# Patient Record
(demographics unavailable — no encounter records)

---

## 2024-10-08 NOTE — HISTORY OF PRESENT ILLNESS
[de-identified] : Cough, phlegm and PND sensation which causes him to be nauseated. He has a runny nose despite azelastine and fluticasone daily. Previously ipratropium wasn't helpful. No obvious allergies GERD for which he uses a wedge pillow at night. He takes omeprazole twice/day but gets reflux all the time anyway. Known large HH which recurred after a fundo 30 yrs ago.  CYNTHIA on CPAP Has hearing aids; no tinnitus. He has an outside audiologist who tests him regularly.

## 2024-10-08 NOTE — ASSESSMENT
[FreeTextEntry1] : We reviewed reflux precautions and the patient was provided with the corresponding educational handout.  We discussed a possible PNN ablation if ipratropium works for him

## 2024-10-08 NOTE — PHYSICAL EXAM
[Hearing Loss Right Only] : diminished [Hearing Loss Left Only] : diminished [FreeTextEntry8] : Obstructing cerumen was removed with a hook & suction [FreeTextEntry9] : obstructing cerumen removed with suction [Laryngoscopy Performed] : laryngoscopy was performed, see procedure section for findings [Normal] : no masses and lesions seen, face is symmetric

## 2024-10-08 NOTE — PROCEDURE
[de-identified] : Indication: requirement for exam not possible via anterior examination; globus, CYNTHIA After verbal consent and the administration of an aerosolized oxymetazoline/lidocaine mix, examination was performed with a flexible endoscope placed through the nose which was attached to a video monitoring system. Findings: Nasopharynx: unremarkable Soft palate, lateral and posterior pharyngeal walls: unremarkable Base of tongue & lingual tonsil: moderate retrodisplacement Vallecula: unremarkable Epiglottis: unremarkable Piriform sinuses and pharyngoesophageal junction: unremarkable Arytenoids and AE folds: mild-mod interarytenoid edema Ventricle and false vocal folds: FVF edema True vocal folds: Smooth free edge w/ TA bowing; surface without ectasias or edema; normal movement bilaterally with good apposition in phonation Visible subglottis: unremarkable Narrow-band imaging: ok Other findings: ELM; supraglottic squeeze w/ phonating

## 2024-10-08 NOTE — CONSULT LETTER
[Dear  ___] : Dear  [unfilled], [Consult Letter:] : I had the pleasure of evaluating your patient, [unfilled]. [Please see my note below.] : Please see my note below. [Consult Closing:] : Thank you very much for allowing me to participate in the care of this patient.  If you have any questions, please do not hesitate to contact me. [Sincerely,] : Sincerely, [FreeTextEntry3] : SUMMER Bahena Jr, MD, FAAOHNS Otolaryngologist [facility]

## 2024-10-21 NOTE — PHYSICAL EXAM
[Ambulatory and capable of all self care but unable to carry out any work activities] : Status 2- Ambulatory and capable of all self care but unable to carry out any work activities. Up and about more than 50% of waking hours [General Appearance - Alert] : alert [General Appearance - In No Acute Distress] : in no acute distress [Neck Appearance] : the appearance of the neck was normal [Jugular Venous Distention Increased] : there was no jugular-venous distention [Exaggerated Use Of Accessory Muscles For Inspiration] : no accessory muscle use [Heart Rate And Rhythm] : heart rate was normal and rhythm regular [Heart Sounds] : normal S1 and S2 [Abnormal Walk] : normal gait [Nail Clubbing] : no clubbing  or cyanosis of the fingernails [Skin Color & Pigmentation] : normal skin color and pigmentation [] : no rash [Sensation] : the sensory exam was normal to light touch and pinprick [Oriented To Time, Place, And Person] : oriented to person, place, and time [Affect] : the affect was normal [Mood] : the mood was normal [FreeTextEntry1] : ambulates w/ cane  [No Focal Deficits] : no focal deficits

## 2024-10-21 NOTE — ASSESSMENT
[FreeTextEntry1] : Patient is a nearly 92 year old male, former smoker with a history of COPD, CKD (Cr 1.78 and eGFR 36), HTN, and CAD who is referred for a symptomatic recurrent hiatal hernia by James Amaro and Nga. He had a hiatal hernia repair 30 years ago through a left thoracotomy. It recurred approximately 20 years ago and he has been living with it since. He reports that he vomits fairly frequently, aspirates and was recently admitted to a hospital in the [facility] with what sounds like a significant aspiration pneumonia requiring intubation and ICU admission; I do not have any records of this stay, however. He was evaluated by a general surgeon previously who offered a laparoscopic robotic assisted repair. He declined intervention at this time. He presents to clinic for treatment recommendations.   His imaging was reviewed. He has approximately two thirds of his stomach in his chest and his proximal esophagus is dilated and fluid filled. He has some interstitial changes in his lungs of aspiration. He reports that he tolerates a diet, has not been losing weight, but does vomit frequently and had a significant aspiration event. He has an indication for repair. Discussed the robotic assisted laparoscopic approach to reduce the hernia, close the hiatus, and fix the anterior gastric wall to the anterior abdominal wall with sutures. Additionally, discussed placement of a gastrostomy tube at the time of surgery for post operative feeding and as a third point of fixation to the anterior abdominal wall. Because of his long standing recurrence and dilation of his esophagus, it likely has abnormal function and even after reduction of the hernia, I would want to reduce the risk of aspiration post operatively by avoiding oral feeds immediately post operatively. Patient understood the operative recommendation.   For this patient, the question is operability and given his age and comorbidities, what is his operative risk. He is a very functional nearly 92 year old, lives with his wife and performs all of his ADLs. His  is firm and he does not appear frail at all. Discussed that this operation would be to improve his quality of life as well as to prevent any further episodes of aspiration, that could be life threatening. He understood this.   We will obtain a medical evaluation to assess his operative risk. We will obtain spirometry. He does not need any further testing specific to the hernia because it would not change the operative plan. Once all evaluations have been performed, he will return to clinic to discuss risk and next steps forward. He understood this plan and agreed. All questions were answered and concerns were addressed.   PLAN 1.  Cardiology evaluation 2.  Medical evaluation 3.  Spirometry 4.  Return to clinic  I, Dr. Olsen, personally performed the evaluation and management (E/M) services for this new patient. That E/M includes conducting the clinically appropriate initial history &/or exam, assessing all conditions, and establishing the plan of care. Today, my RAMBO, [Askem], was here to observe my evaluation and management service for this patient & follow plan of care established by me going forward.

## 2024-10-21 NOTE — HISTORY OF PRESENT ILLNESS
[FreeTextEntry1] : Patient is a 90 y/o male with a large recurrent hiatal hernia with reflux, emesis and suspected aspiration. His pmhx is significant for CKD, HTN, HLD, CAD, COPD, emphysema, hypothyroidism and CYNTHIA (not using his CPAP).  He initially underwent hiatal hernia repair with Dr. Herson Braun in 1995 via L thoracotomy. Reports that he felt well until a few years ago when his symptoms recurred.  Esophagram on 02/07/2024 showed a large hiatal hernia and tortuous and dilated distal esophagus up to 3.9 cm. He is currently taking Famotidine 40mg at bedtime, Pantoprazole 40mg prior to breakfast and dinner, Gaviscon 2-3x per day, and sleeping with a wedge pillow. States that he vomits one or two days a week with wrenching.  Of note, he was hospitalized in early August for six days with pneumonia in the [facility], intubated for three days. During that time, he had a bronchoscopy with removal of particulate matter. He reports vomiting when EMTs took him to the hospital. Pt has post-nasal drip, feels that that contributes to him getting sick and vomiting, scheduled to follow up with Dr. Welch (ENT).   He is referred by DR. JOSE RODAS.  Work up is as follows:  PFT done on 10/8/24 showed FEV1 =  1.97, 74% of predicted value, FVC =3.44 , 94% of predicted value, PEF = 4.15, 53% of predicted value and DLCO =7.6 , 35% of predicted value.  6 min walk test 10/8/24: Patient completed 1500 feet, 457 meters w/ cane. Pre: 138/73, HR 87, 95% RA, dyspnea 0, fatigue 0 Post: 139/72, HR 99, 95% RA, dyspnea 4, fatigue 3  CT chest 9/20/24: Persistent multifocal bilateral infectious/inflammatory pneumonitis, greater on the right than on the left.  Xray esophagram 2/7/24: 1.  Large hiatal hernia. 2.  Tortuous and dilated distal esophagus up to 3.9 cm.  He reports being evaluated by multiple providers at [facility] for his s/s most recently with Dr. Nelson. Notes a chronic cough, reflux not relieved by multiple PPIs, post-nasal drip, and emesis at least once weekly with retching.

## 2024-10-21 NOTE — HISTORY OF PRESENT ILLNESS
[FreeTextEntry1] : Patient is a 90 y/o male with a large recurrent hiatal hernia with reflux, emesis and suspected aspiration. His pmhx is significant for CKD, HTN, HLD, CAD, COPD, emphysema, hypothyroidism and CYNTHIA (not using his CPAP).  He initially underwent hiatal hernia repair with Dr. Herson Braun in 1995 via L thoracotomy. Reports that he felt well until a few years ago when his symptoms recurred.  Esophagram on 02/07/2024 showed a large hiatal hernia and tortuous and dilated distal esophagus up to 3.9 cm. He is currently taking Famotidine 40mg at bedtime, Pantoprazole 40mg prior to breakfast and dinner, Gaviscon 2-3x per day, and sleeping with a wedge pillow. States that he vomits one or two days a week with wrenching.  Of note, he was hospitalized in early August for six days with pneumonia in the [facility], intubated for three days. During that time, he had a bronchoscopy with removal of particulate matter. He reports vomiting when EMTs took him to the hospital. Pt has post-nasal drip, feels that that contributes to him getting sick and vomiting, scheduled to follow up with Dr. Welch (ENT).   He is referred by DR. JOSE RODAS.  Work up is as follows:  PFT done on 10/8/24 showed FEV1 =  1.97, 74% of predicted value, FVC =3.44 , 94% of predicted value, PEF = 4.15, 53% of predicted value and DLCO =7.6 , 35% of predicted value.  6 min walk test 10/8/24: Patient completed 1500 feet, 457 meters w/ cane. Pre: 138/73, HR 87, 95% RA, dyspnea 0, fatigue 0 Post: 139/72, HR 99, 95% RA, dyspnea 4, fatigue 3  CT chest 9/20/24: Persistent multifocal bilateral infectious/inflammatory pneumonitis, greater on the right than on the left.  Xray esophagram 2/7/24: 1.  Large hiatal hernia. 2.  Tortuous and dilated distal esophagus up to 3.9 cm.  He reports being evaluated by multiple providers at [facility] for his s/s most recently with Dr. Nelson. Notes a chronic cough, reflux not relieved by multiple PPIs, post-nasal drip, and emesis at least once weekly with retching.

## 2024-10-21 NOTE — CONSULT LETTER
[FreeTextEntry2] : DR. JOSE RODAS [FreeTextEntry3] : Ferdinand Olsen MD   Attending Thoracic Surgeon  Department of Cardiovascular and Thoracic Surgery   of Cardiothoracic Surgery  [employer] at [facility], Division of Thoracic Surgery  [address] [id number]  Tel: [phone]  Fax: [phone]

## 2024-10-21 NOTE — ASSESSMENT
[FreeTextEntry1] : Patient is a nearly 92 year old male, former smoker with a history of COPD, CKD (Cr 1.78 and eGFR 36), HTN, and CAD who is referred for a symptomatic recurrent hiatal hernia by James Amaro and Nga. He had a hiatal hernia repair 30 years ago through a left thoracotomy. It recurred approximately 20 years ago and he has been living with it since. He reports that he vomits fairly frequently, aspirates and was recently admitted to a hospital in the [facility] with what sounds like a significant aspiration pneumonia requiring intubation and ICU admission; I do not have any records of this stay, however. He was evaluated by a general surgeon previously who offered a laparoscopic robotic assisted repair. He declined intervention at this time. He presents to clinic for treatment recommendations.   His imaging was reviewed. He has approximately two thirds of his stomach in his chest and his proximal esophagus is dilated and fluid filled. He has some interstitial changes in his lungs of aspiration. He reports that he tolerates a diet, has not been losing weight, but does vomit frequently and had a significant aspiration event. He has an indication for repair. Discussed the robotic assisted laparoscopic approach to reduce the hernia, close the hiatus, and fix the anterior gastric wall to the anterior abdominal wall with sutures. Additionally, discussed placement of a gastrostomy tube at the time of surgery for post operative feeding and as a third point of fixation to the anterior abdominal wall. Because of his long standing recurrence and dilation of his esophagus, it likely has abnormal function and even after reduction of the hernia, I would want to reduce the risk of aspiration post operatively by avoiding oral feeds immediately post operatively. Patient understood the operative recommendation.   For this patient, the question is operability and given his age and comorbidities, what is his operative risk. He is a very functional nearly 92 year old, lives with his wife and performs all of his ADLs. His  is firm and he does not appear frail at all. Discussed that this operation would be to improve his quality of life as well as to prevent any further episodes of aspiration, that could be life threatening. He understood this.   We will obtain a medical evaluation to assess his operative risk. We will obtain spirometry. He does not need any further testing specific to the hernia because it would not change the operative plan. Once all evaluations have been performed, he will return to clinic to discuss risk and next steps forward. He understood this plan and agreed. All questions were answered and concerns were addressed.   PLAN 1.  Cardiology evaluation 2.  Medical evaluation 3.  Spirometry 4.  Return to clinic  I, Dr. Olsen, personally performed the evaluation and management (E/M) services for this new patient. That E/M includes conducting the clinically appropriate initial history &/or exam, assessing all conditions, and establishing the plan of care. Today, my RAMBO, [[email or web address]], was here to observe my evaluation and management service for this patient & follow plan of care established by me going forward.

## 2024-10-21 NOTE — ASSESSMENT
[FreeTextEntry1] : Patient is a nearly 92 year old male, former smoker with a history of COPD, CKD (Cr 1.78 and eGFR 36), HTN, and CAD who is referred for a symptomatic recurrent hiatal hernia by James Amaro and Nga. He had a hiatal hernia repair 30 years ago through a left thoracotomy. It recurred approximately 20 years ago and he has been living with it since. He reports that he vomits fairly frequently, aspirates and was recently admitted to a hospital in the [facility] with what sounds like a significant aspiration pneumonia requiring intubation and ICU admission; I do not have any records of this stay, however. He was evaluated by a general surgeon previously who offered a laparoscopic robotic assisted repair. He declined intervention at this time. He presents to clinic for treatment recommendations.   His imaging was reviewed. He has approximately two thirds of his stomach in his chest and his proximal esophagus is dilated and fluid filled. He has some interstitial changes in his lungs of aspiration. He reports that he tolerates a diet, has not been losing weight, but does vomit frequently and had a significant aspiration event. He has an indication for repair. Discussed the robotic assisted laparoscopic approach to reduce the hernia, close the hiatus, and fix the anterior gastric wall to the anterior abdominal wall with sutures. Additionally, discussed placement of a gastrostomy tube at the time of surgery for post operative feeding and as a third point of fixation to the anterior abdominal wall. Because of his long standing recurrence and dilation of his esophagus, it likely has abnormal function and even after reduction of the hernia, I would want to reduce the risk of aspiration post operatively by avoiding oral feeds immediately post operatively. Patient understood the operative recommendation.   For this patient, the question is operability and given his age and comorbidities, what is his operative risk. He is a very functional nearly 92 year old, lives with his wife and performs all of his ADLs. His  is firm and he does not appear frail at all. Discussed that this operation would be to improve his quality of life as well as to prevent any further episodes of aspiration, that could be life threatening. He understood this.   We will obtain a medical evaluation to assess his operative risk. We will obtain spirometry. He does not need any further testing specific to the hernia because it would not change the operative plan. Once all evaluations have been performed, he will return to clinic to discuss risk and next steps forward. He understood this plan and agreed. All questions were answered and concerns were addressed.   PLAN 1.  Cardiology evaluation 2.  Medical evaluation 3.  Spirometry 4.  Return to clinic  I, Dr. Olsen, personally performed the evaluation and management (E/M) services for this new patient. That E/M includes conducting the clinically appropriate initial history &/or exam, assessing all conditions, and establishing the plan of care. Today, my RAMBO, [[employer]], was here to observe my evaluation and management service for this patient & follow plan of care established by me going forward.

## 2024-10-21 NOTE — ASSESSMENT
[FreeTextEntry1] : Patient is a nearly 92 year old male, former smoker with a history of COPD, CKD (Cr 1.78 and eGFR 36), HTN, and CAD who is referred for a symptomatic recurrent hiatal hernia by James Amaro and Nga. He had a hiatal hernia repair 30 years ago through a left thoracotomy. It recurred approximately 20 years ago and he has been living with it since. He reports that he vomits fairly frequently, aspirates and was recently admitted to a hospital in the [facility] with what sounds like a significant aspiration pneumonia requiring intubation and ICU admission; I do not have any records of this stay, however. He was evaluated by a general surgeon previously who offered a laparoscopic robotic assisted repair. He declined intervention at this time. He presents to clinic for treatment recommendations.   His imaging was reviewed. He has approximately two thirds of his stomach in his chest and his proximal esophagus is dilated and fluid filled. He has some interstitial changes in his lungs of aspiration. He reports that he tolerates a diet, has not been losing weight, but does vomit frequently and had a significant aspiration event. He has an indication for repair. Discussed the robotic assisted laparoscopic approach to reduce the hernia, close the hiatus, and fix the anterior gastric wall to the anterior abdominal wall with sutures. Additionally, discussed placement of a gastrostomy tube at the time of surgery for post operative feeding and as a third point of fixation to the anterior abdominal wall. Because of his long standing recurrence and dilation of his esophagus, it likely has abnormal function and even after reduction of the hernia, I would want to reduce the risk of aspiration post operatively by avoiding oral feeds immediately post operatively. Patient understood the operative recommendation.   For this patient, the question is operability and given his age and comorbidities, what is his operative risk. He is a very functional nearly 92 year old, lives with his wife and performs all of his ADLs. His  is firm and he does not appear frail at all. Discussed that this operation would be to improve his quality of life as well as to prevent any further episodes of aspiration, that could be life threatening. He understood this.   We will obtain a medical evaluation to assess his operative risk. We will obtain spirometry. He does not need any further testing specific to the hernia because it would not change the operative plan. Once all evaluations have been performed, he will return to clinic to discuss risk and next steps forward. He understood this plan and agreed. All questions were answered and concerns were addressed.   PLAN 1.  Cardiology evaluation 2.  Medical evaluation 3.  Spirometry 4.  Return to clinic  I, Dr. Olsen, personally performed the evaluation and management (E/M) services for this new patient. That E/M includes conducting the clinically appropriate initial history &/or exam, assessing all conditions, and establishing the plan of care. Today, my RAMBO, [[email or web address]], was here to observe my evaluation and management service for this patient & follow plan of care established by me going forward.

## 2024-10-21 NOTE — CONSULT LETTER
[FreeTextEntry2] : DR. JOSE RODAS [FreeTextEntry3] : Ferdinand Olsen MD   Attending Thoracic Surgeon  Department of Cardiovascular and Thoracic Surgery   of Cardiothoracic Surgery  [employer] at [facility], Division of Thoracic Surgery  [address] [id number]  Tel: [phone]  Fax: [phone]

## 2024-10-21 NOTE — HISTORY OF PRESENT ILLNESS
[FreeTextEntry1] : Patient is a 92 y/o male with a large recurrent hiatal hernia with reflux, emesis and suspected aspiration. His pmhx is significant for CKD, HTN, HLD, CAD, COPD, emphysema, hypothyroidism and CYNTHIA (not using his CPAP).  He initially underwent hiatal hernia repair with Dr. Herson Braun in 1995 via L thoracotomy. Reports that he felt well until a few years ago when his symptoms recurred.  Esophagram on 02/07/2024 showed a large hiatal hernia and tortuous and dilated distal esophagus up to 3.9 cm. He is currently taking Famotidine 40mg at bedtime, Pantoprazole 40mg prior to breakfast and dinner, Gaviscon 2-3x per day, and sleeping with a wedge pillow. States that he vomits one or two days a week with wrenching.  Of note, he was hospitalized in early August for six days with pneumonia in the [facility], intubated for three days. During that time, he had a bronchoscopy with removal of particulate matter. He reports vomiting when EMTs took him to the hospital. Pt has post-nasal drip, feels that that contributes to him getting sick and vomiting, scheduled to follow up with Dr. Welch (ENT).   He is referred by DR. JOSE RODAS.  Work up is as follows:  PFT done on 10/8/24 showed FEV1 =  1.97, 74% of predicted value, FVC =3.44 , 94% of predicted value, PEF = 4.15, 53% of predicted value and DLCO =7.6 , 35% of predicted value.  6 min walk test 10/8/24: Patient completed 1500 feet, 457 meters w/ cane. Pre: 138/73, HR 87, 95% RA, dyspnea 0, fatigue 0 Post: 139/72, HR 99, 95% RA, dyspnea 4, fatigue 3  CT chest 9/20/24: Persistent multifocal bilateral infectious/inflammatory pneumonitis, greater on the right than on the left.  Xray esophagram 2/7/24: 1.  Large hiatal hernia. 2.  Tortuous and dilated distal esophagus up to 3.9 cm.  He reports being evaluated by multiple providers at [facility] for his s/s most recently with Dr. Nelson. Notes a chronic cough, reflux not relieved by multiple PPIs, post-nasal drip, and emesis at least once weekly with retching.

## 2024-10-21 NOTE — HISTORY OF PRESENT ILLNESS
[FreeTextEntry1] : Patient is a 92 y/o male with a large recurrent hiatal hernia with reflux, emesis and suspected aspiration. His pmhx is significant for CKD, HTN, HLD, CAD, COPD, emphysema, hypothyroidism and CYNTHIA (not using his CPAP).  He initially underwent hiatal hernia repair with Dr. Herson Braun in 1995 via L thoracotomy. Reports that he felt well until a few years ago when his symptoms recurred.  Esophagram on 02/07/2024 showed a large hiatal hernia and tortuous and dilated distal esophagus up to 3.9 cm. He is currently taking Famotidine 40mg at bedtime, Pantoprazole 40mg prior to breakfast and dinner, Gaviscon 2-3x per day, and sleeping with a wedge pillow. States that he vomits one or two days a week with wrenching.  Of note, he was hospitalized in early August for six days with pneumonia in the [facility], intubated for three days. During that time, he had a bronchoscopy with removal of particulate matter. He reports vomiting when EMTs took him to the hospital. Pt has post-nasal drip, feels that that contributes to him getting sick and vomiting, scheduled to follow up with Dr. Welch (ENT).   He is referred by DR. JOSE RODAS.  Work up is as follows:  PFT done on 10/8/24 showed FEV1 =  1.97, 74% of predicted value, FVC =3.44 , 94% of predicted value, PEF = 4.15, 53% of predicted value and DLCO =7.6 , 35% of predicted value.  6 min walk test 10/8/24: Patient completed 1500 feet, 457 meters w/ cane. Pre: 138/73, HR 87, 95% RA, dyspnea 0, fatigue 0 Post: 139/72, HR 99, 95% RA, dyspnea 4, fatigue 3  CT chest 9/20/24: Persistent multifocal bilateral infectious/inflammatory pneumonitis, greater on the right than on the left.  Xray esophagram 2/7/24: 1.  Large hiatal hernia. 2.  Tortuous and dilated distal esophagus up to 3.9 cm.  He reports being evaluated by multiple providers at [facility] for his s/s most recently with Dr. Nelson. Notes a chronic cough, reflux not relieved by multiple PPIs, post-nasal drip, and emesis at least once weekly with retching.

## 2024-10-21 NOTE — CONSULT LETTER
[FreeTextEntry2] : DR. JOSE RODAS [FreeTextEntry3] : Ferdinand Olsen MD   Attending Thoracic Surgeon  Department of Cardiovascular and Thoracic Surgery   of Cardiothoracic Surgery  [employer] at [facility], Division of Thoracic Surgery  [address] [address]  Tel: [phone]  Fax: [phone]

## 2024-10-21 NOTE — CONSULT LETTER
[FreeTextEntry2] : DR. JOSE RODAS [FreeTextEntry3] : Ferdinand Olsen MD   Attending Thoracic Surgeon  Department of Cardiovascular and Thoracic Surgery   of Cardiothoracic Surgery  [employer] at [facility], Division of Thoracic Surgery  [address] [address]  Tel: [phone]  Fax: [phone]

## 2024-10-28 NOTE — ASSESSMENT
[FreeTextEntry1] : 90 year old  man with BPH s/p PAE x 2 and HoLEP 6/29/23. He is off all BPH medications. Elevated PSA 7.8 ng/ml (higher pre PAE), negative MRI 8/2021, normal PSAD 0.04 ng/ml/cc, one prior negative biopsy 2001, denies FH, neg REILLY.  BPH s/p PAE 5/23/23  x and HoLEP 6/29/23  - s/p repeat PAE with NBCA 5/23/23 and HoLEP 6/29/23 (pathology BPH) - hx of severe hydroureternephrosis, was on terazosin and finasteride in past, no meds now - US on 11/2/23 showed Unchanged mild fullness of the collecting system of the left inferior moiety. No obstructive hydronephrosis - 9/23/24 creatinine 1.84 with Dr. Beasley, also following with Dr. Mascorro - PVR (10/28/24) 347cc  (confirmed x 2) - recommend cystoscopy to rule out stricture and UDS, both can be done by Dr. Gallagher, then RTC after to discuss findings with myself or Dr Crain   Elevated PSA - Prostate MRI from 2/22/23 was negative for lesions - Given age of 90, negative MRI, previous neg biopsy, PSAD 0.04 ng/ml/cc and all other clinical factors he is in agreement to stop screening for prostate cancer.  Microscopic hematuria - MR Urogram reviewed with Dr Nathaniel Null 3/20/23- duplicated system LEFT. mod-severe left hydronephrosis and hydroureter. - Cystoscopy 4/24/23 - negative for stricture but debris within the bladder - denies gross hematuria, ua/ culture sent today 1(0/28/24)  RTC in ~1 month after UDS and cystoscopy with Dr. Gallagher   Thank you very much for allowing me to assist in the care of this patient. Please do not hesitate to contact me with any additional questions or concerns.     Sincerely,     Tera Abdullahi D.O. Professor of Urology and Radiology  of Urology at [facility] Director for Prostate Cancer [address] [place] [address], 5th Floor [facility], [address] Phone: [phone]   I was present with the Resident (Boaz Poole MD) during the hernandez portions of the history and exam. I discussed the case with the Resident and agree with the findings and plan as documented in the Resident/Fellow 's note, unless noted below.

## 2024-10-28 NOTE — ASSESSMENT
[FreeTextEntry1] : 90 year old  man with BPH s/p PAE x 2 and HoLEP 6/29/23. He is off all BPH medications. Elevated PSA 7.8 ng/ml (higher pre PAE), negative MRI 8/2021, normal PSAD 0.04 ng/ml/cc, one prior negative biopsy 2001, denies FH, neg REILLY.  BPH s/p PAE 5/23/23  x and HoLEP 6/29/23  - s/p repeat PAE with NBCA 5/23/23 and HoLEP 6/29/23 (pathology BPH) - hx of severe hydroureternephrosis, was on terazosin and finasteride in past, no meds now - US on 11/2/23 showed Unchanged mild fullness of the collecting system of the left inferior moiety. No obstructive hydronephrosis - 9/23/24 creatinine 1.84 with Dr. Beasley, also following with Dr. Mascorro - PVR (10/28/24) 347cc  (confirmed x 2) - recommend cystoscopy to rule out stricture and UDS, both can be done by Dr. Gallagher, then RTC after to discuss findings with myself or Dr Crain   Elevated PSA - Prostate MRI from 2/22/23 was negative for lesions - Given age of 90, negative MRI, previous neg biopsy, PSAD 0.04 ng/ml/cc and all other clinical factors he is in agreement to stop screening for prostate cancer.  Microscopic hematuria - MR Urogram reviewed with Dr Nathaniel Null 3/20/23- duplicated system LEFT. mod-severe left hydronephrosis and hydroureter. - Cystoscopy 4/24/23 - negative for stricture but debris within the bladder - denies gross hematuria, ua/ culture sent today 1(0/28/24)  RTC in ~1 month after UDS and cystoscopy with Dr. Gallagher   Thank you very much for allowing me to assist in the care of this patient. Please do not hesitate to contact me with any additional questions or concerns.     Sincerely,     Tera Abdullahi D.O. Professor of Urology and Radiology  of Urology at [facility] Director for Prostate Cancer [address] [place] [address], 5th Floor [facility], [address] Phone: [phone]   I was present with the Resident (Boaz Poole MD) during the hernandez portions of the history and exam. I discussed the case with the Resident and agree with the findings and plan as documented in the Resident/Fellow 's note, unless noted below.

## 2024-10-28 NOTE — HISTORY OF PRESENT ILLNESS
[FreeTextEntry1] : Dear Dr. TY Mcadams (PCP) Dear Dr. Bhavik Mascorro (Nephrologist)  Chief Complaint: BPH, previously had 287cc gland with successful PAE in 9/2020. He had attempted repeat PAE in 5/2023 that was unsuccessful. S/P HoLEP 6/30/23 with Dr. Crain  Date of first visit: 07/21/2020  JUAN PEDROZA is a 91 year old  man with hx of longstanding LUTS (obstructive and irritative), nephrolithiasis s/p ESWL, CKD, and BPH 287cc gland (on 3D) s/p bilateral PAE with particles and coils on 9/11/20 now 169cc. Sent for NM renal lasix scan. He has bladder outlet obstruction and severe hydro. The cystoscopy 4/24/23 - negative for stricture but debris within the bladder. A catheter was placed on 5/9/23 for retention and moderate to severe left hydronephrosis and hydroureter. He is now s/p bilateral PAE with NBCA on 5/23/23. He is now s/p HoLEP 6/29/23. He's off all BPH medications. He is very happy.  He has ED but is not sexually active. Approximately 20 yrs ago he had a penile prosthesis placed by Dr. Live, which stopped functioning 7 yrs ago.  In Aug 2024, patient admitted for pneumonia and sepsis  CT Hx 03/29/2023 Renal [place] - Persistent severe left hydroureteronephrosis with findings of prostatic enlargement and chronic bladder outlet obstruction. Negative for nephroureterolithiasis.  7/22/2020 CT Abdomen/ Pelvis - No evidence of nephroureterolithiasis. 4.1 cm rounded soft tissue density in the mid left kidney. Cholelithiasis. Markedly enlarged prostate gland. Large Hiatal hernia.  MRI Hx MRI Abdomen and Pelvis -[facility] on 3/13/2023. No right hydronephrosis. Moderate to severe left hydronephrosis and left hydroureter. Duplex left kidney. Markedly enlarged prostate-BPH. Trabeculated and sacculated urinary bladder. Penile prosthesis. Cholelithiasis. Large hiatal hernia..  MRI at [facility] 02/20/2023. 169.7mL prostate with PIRADS 2 No targetable lesion. No LAD No EPE, No Bony Lesions. The images have been reviewed and clinical implications discussed with the patient.  MRI at [facility] on 8/18/2021. 254 cc (190 on 3D) prostate with no suspicious prostate lesions. Several extruded BPH nodules. Stable MRI from 8/2020. No LAD No EPE, No Bony Lesions.  MRI ([facility]) read 08/04/2020. 360 cc (287 on 3D), PSAD 0.18 (64/360), 2.2 cm nodule in the left periurethral position (series 3, image 6), similar to BPH findings. Possible seminal abnormality but susp of CaP solitary focus low No LAD. No EPE. No Bony Lesions. The images have been reviewed and clinical implications discussed with the patient.  Nuclear Med Hx 04/10/2023 - Symmetric renal function. There is dilatation of the left renal pelvis and ureter and incomplete bladder emptying. Although the response to Lasix was suboptimal, there are several pieces of evidence that there is no significant obstruction at the present time and that hydro is 2/2 DEGROOT.  Biopsy Hx 3/13/01--BPH  PSA hx: 7.8 2/28/22 - corrected for 5ARI. PSAD 0.04 ng/ml/cc 14.44 ng/mL -- 6/3/21- in the setting of an candida infx, off 5ARI 5.94 ng/mL -- 02/02/2021- off 5ARI 32.40 ng/mL -- 07/29/20 (recent + urine culture) 5.5 ng/mL -- 11/03/19- not corrected for 5ARI 9.9 ng/mL -- 02/23/09- not corrected for 5ARI 3.9 ng/mL -- 02/18/00- not corrected for 5ARI  10/28/2024 IPSS  8  QOL 1 Uroflow max flow 3.9 ml/s, ave 4.4 ml/s, VV53.3cc PVR: 347cc  04/24/2023 IPSS QOL LEONORA  cc, Q Max 11.9 ml/s  03/20/2023 IPSS 17 QOL 3 LEONORA 1-NSA Flow/PVR: vv 60cc not valid. PVR 101cc  03/01/2023 IPSS 15 QOL 3 LEONORA 1- NSA Flow/PVR: flow machine not working. PVR 256cc  02/15/2023 IPSS 11 QOL 3 LEONORA NSA Flow/PVR  8/26/2022 IPSS 8 QOL 1 LEONORA 1-NSA Flow/PVR: Peak 11.8 ml/s, avg 4.3 ml/s, vv 79ml not valid. PVR 103ml  02/28/2022 IPSS 12 QOL 2 LEONORA 5 Flow/PVR: Peak 10 ml/s, vv 92 not valid, PVR 35 ml  8/23/2021 IPSS 7 QOL 1 LEONORA 1-NSA Flow/PVR: Peak 17.4 ml/s, avg 8.4 ml/s, vv 211 ml. PVR 142ml  6/30/21 IPSS 7 QOL 1  cc, Uroflow avg 4.7 QMax 16.6 ml/s - double void  1/26/2021 IPSS 6 QOL 1 LEONORA 1  7/28/2020 IPSS 9 QOL 2 LEONORA 1  The patient denies fevers, chills, nausea and or vomiting and no unexplained weight loss.  All pertinent laboratory, films and physician notes were reviewed.

## 2024-10-29 NOTE — REVIEW OF SYSTEMS
[Lower Ext Edema] : lower extremity edema [Cough] : cough [Vomiting] : vomiting [Negative] : Heme/Lymph

## 2024-10-29 NOTE — ASSESSMENT
[FreeTextEntry1] : I find the patient to be acceptable risk for surgery.  He has no history of CAD, MI, CVA or heart failure.  He has no angina and good exercise tolerance.  HE is not frail.  His LV function is normal on recent echo.  An ischemic w/u is not appropriate here.  There is no doubt that risk is increased due to age, but I feel that surgery can proceed from CV standpoint.

## 2024-10-29 NOTE — REASON FOR VISIT
[Other: ____] : [unfilled] [FreeTextEntry3] : Ferdinand Olsen and Debbie Ramirez [FreeTextEntry1] : Pleasant 91 y/o man with a massive hiatal hernia.  Notes describe 2/3 of stomach is in the chest with esophageal dilatation.  He had repair of this 30 years ago.  His current situation includes intermittent vomiting, reflux, cough.  He recently was in hospital for aspiration pneumonia.  He denies h/o cardiac dz, and in May 2024 had an echo showing normal LVEF and no sig valve issue.  EKG is NSR, IVCD and left axis.

## 2024-10-29 NOTE — PHYSICAL EXAM

## 2024-11-18 NOTE — HISTORY OF PRESENT ILLNESS
[FreeTextEntry1] : 91-year-old gentleman with a history of longstanding BPH/LUTS, nephrolithiasis, chronic kidney disease, past surgical history of prostatic arterial embolization x 2, in addition to status post HoLEP procedure approximately a year ago.  He has persistent elevated residuals with LUTS symptoms.  He has had ultrasound imaging which revealed mild fullness of the collecting system no clear hydronephrosis. The patient is here today to have urodynamics/cystoscopy performed, review results and discuss treatment options. Denies significant change in med/surg history since last office visit.   Cystoscopy -large prostatic urethral defect, bladder neck although slightly narrower than the prostatic urethral defect is open.  UDS -   Filling/Storage Phase: First sensation 8 mL, First desire 180 mL, Normal desire 333 mL and Cystometric capacity 500 mL. Involuntary contractions were not present . Pt did not demonstrate stress urinary incontinence. Pt did not demonstrate urge urinary incontinence. Compliance: normal. EMG Activity: normal.    Voiding Phase: post void residual 0 mL.   Additional Comments: Patient unable to void with catheter in place-- voided 450 in bathroom. Unable to mount a bladder contraction. Straining to void.   Urodynamic Interpretation :   Normal bladder sensation. Normal bladder capacity. Patient experiencing no detrusor instability. The detrusor contractility is absent. The patient has complete bladder emptying, a post void residual of 0 cc. The spincter activity  is normal synergic.

## 2024-11-18 NOTE — LETTER BODY
[Dear  ___] : Dear  [unfilled], [Consult Letter:] : I had the pleasure of evaluating your patient, [unfilled]. [Please see my note below.] : Please see my note below. [Consult Closing:] : Thank you very much for allowing me to participate in the care of this patient.  If you have any questions, please do not hesitate to contact me. [Sincerely,] : Sincerely, [FreeTextEntry3] : Yuli Gallagher MD System Director Urogynecology/URPS Department of Urology [facility]   at [facility]  of Urology [facility] at [facility]

## 2024-11-18 NOTE — LETTER BODY
[Dear  ___] : Dear  [unfilled], [Consult Letter:] : I had the pleasure of evaluating your patient, [unfilled]. [Please see my note below.] : Please see my note below. [Consult Closing:] : Thank you very much for allowing me to participate in the care of this patient.  If you have any questions, please do not hesitate to contact me. [Sincerely,] : Sincerely, [FreeTextEntry3] : Yuli Gallagher MD System Director Urogynecology/URPS Department of Urology [facility]   at [facility]  of Urology [facility] at [facility]

## 2024-11-18 NOTE — ASSESSMENT
[FreeTextEntry1] :   Impression/plan: 91-year-old gentleman with a history of BPH/LUTS status post PA E x 2 and HoLEP procedure with incomplete bladder emptying, ultrasonic findings of renal pelvic fullness but no clear hydronephrosis.  We reviewed both his cystoscopy and his urodynamic study at length in the office today.  Cystoscopic findings included a open prostatic urethral defect with although slightly narrow bladder neck, open with no sign of obstruction.  Urodynamic study reveals that he is likely a Valsalva voider as he is unable to mount a coordinated bladder contraction.  He was able to void after catheters and the study was completed with a low residual of 50 mL.  I reviewed with him the studies and explained that intervention at this point is not indicated.  He can have close follow-up with upper tract ultrasound as well as BMPs to monitor his kidney function.  Timed and double void technique discussed with the patient.  I also called Dr. Abdullahi to discuss the findings and plan, he is in agreement with the plan.  Patient will follow-up in 6 months.

## 2024-11-19 NOTE — ASSESSMENT
[FreeTextEntry1] : We discussed the likely multifactorial nature of his cough and how to optimize reflux prevention and meds use.   We wrote out how to use his nasal sprays; he may stop the azelastine in the sake if simplicity. He will try the ipratropium for a period of time to see if it helps.

## 2024-11-19 NOTE — PHYSICAL EXAM
[Normal] : the left external ear was normal [Hearing Loss Right Only] : diminished [Hearing Loss Left Only] : diminished

## 2024-11-19 NOTE — HISTORY OF PRESENT ILLNESS
[de-identified] : Cough, phlegm and PND sensation which causes him to be nauseated- this is worst at night. He has a runny nose despite azelastine and fluticasone daily- he never picked up the ipratropium and is confused about these meds. Previously ipratropium wasn't helpful. No obvious allergies GERD for which he uses a wedge pillow at night. He takes omeprazole twice/day but gets reflux all the time anyway and sometimes vomits. Known large HH which recurred after a fundo 30 yrs ago; he's seeing CT surg w/ a consideration of revising this. CYNTHIA on CPAP Has hearing aids; no tinnitus. He has an outside audiologist who tests him regularly.

## 2024-11-22 NOTE — ASSESSMENT
[FreeTextEntry1] : Patient is a 91 year old male, nearly 92, who presents today with his wife to further discuss surgical repair of his recurrent hiatal hernia. He has had spirometry, which is improved from his previous. He has seen Dr. Colin with cardiology for preoperative evaluation. He does not require further work up.   Again discussed surgical plan. Discussed robotic approach, gastropexy and gastrostomy tube placement. Discussed expected hospital stay and post operative plan. Discussed possibility of not being able to reduce the hernia from the abdomen. If that were the case, I would not recommend a redo thoracotomy given the patient's age and the patient would not want to proceed with a redo thoracotomy.   Patient would like to further consider surgery. In the meantime, I recommended eating soft food and small frequent meals as he is have difficulty with a general diet and larger meals. We will follow up in 2 weeks and we will refer him to a nutritionist per his and his wife's request.   PLAN 1.  2 week follow up 2.  Referral to nutrition   I, Dr. Olsen, personally performed the evaluation and management (E/M) services for this established patient who presents today with (a) new problem(s)/exacerbation of (an) existing condition(s). That E/M includes conducting the clinically appropriate interval history &/or exam, assessing all new/exacerbated conditions, and establishing a new plan of care. Today, my RAMBO, [Idania Small], was here to observe my evaluation and management service for this new problem/exacerbated condition and follow the plan of care established by me going forward.

## 2024-11-22 NOTE — PHYSICAL EXAM
[] : no respiratory distress [Respiration, Rhythm And Depth] : normal respiratory rhythm and effort [Heart Rate And Rhythm] : heart rate was normal and rhythm regular [Examination Of The Chest] : the chest was normal in appearance [Bowel Sounds] : normal bowel sounds [Abdomen Soft] : soft [Abnormal Walk] : normal gait [Skin Color & Pigmentation] : normal skin color and pigmentation [Skin Turgor] : normal skin turgor [Oriented To Time, Place, And Person] : oriented to person, place, and time [Impaired Insight] : insight and judgment were intact

## 2024-11-22 NOTE — HISTORY OF PRESENT ILLNESS
[FreeTextEntry1] : This 91-year-old male, a former smoker with COPD, CKD (creatinine 1.78, eGFR 36), hypertension, and CAD, is referred by James Amaro, and Nga for a symptomatic, recurrent hiatal hernia. He underwent hiatal hernia repair via left thoracotomy 30 years ago, with recurrence approximately 20 years ago. He reports frequent vomiting and aspiration, recently requiring intubation and ICU admission for aspiration pneumonia. A general surgeon previously offered laparoscopic robotic-assisted repair.  Imaging reveals approximately two-thirds of his stomach herniated into his chest, a dilated and fluid-filled proximal esophagus, and interstitial lung changes consistent with aspiration. Although he tolerates a diet, he vomits frequently and experienced a significant aspiration event. Surgical repair is indicated. At a prior visit, hernia repair with concurrent gastrostomy tube placement was discussed. The gastrostomy tube would facilitate postoperative feeding and provide an additional point of fixation to the anterior abdominal wall. Given the long-standing hernia, esophageal dilation, and likely esophageal dysmotility, postoperative aspiration risk would be minimized by temporarily withholding oral intake.  He has been evaluated by cardiology and his primary medical doctor for preoperative risk assessment. Spirometry was also completed today. No further hernia-specific testing is required.  Updated work up:  Spirometry done on 11/21/24 showed FEV1 = 2.21, 89 % of predicted value, FVC = 3.47, 102% of predicted value, PEF =5.21, 74% of predicted value

## 2024-11-22 NOTE — DATA REVIEWED
[FreeTextEntry1] : PFT done on 10/8/24 showed FEV1 = 1.97, 74% of predicted value, FVC =3.44 , 94% of predicted value, PEF = 4.15, 53% of predicted value and DLCO =7.6 , 35% of predicted value.  6 min walk test 10/8/24: Patient completed 1500 feet, 457 meters w/ cane. Pre: 138/73, HR 87, 95% RA, dyspnea 0, fatigue 0 Post: 139/72, HR 99, 95% RA, dyspnea 4, fatigue 3  CT chest 9/20/24: Persistent multifocal bilateral infectious/inflammatory pneumonitis, greater on the right than on the left.  Xray esophagram 2/7/24: 1. Large hiatal hernia. 2. Tortuous and dilated distal esophagus up to 3.9 cm.

## 2024-11-22 NOTE — PHYSICAL EXAM
[] : no respiratory distress [Respiration, Rhythm And Depth] : normal respiratory rhythm and effort [Heart Rate And Rhythm] : heart rate was normal and rhythm regular [Examination Of The Chest] : the chest was normal in appearance [Abdomen Soft] : soft [Bowel Sounds] : normal bowel sounds [Abnormal Walk] : normal gait [Skin Color & Pigmentation] : normal skin color and pigmentation [Skin Turgor] : normal skin turgor [Oriented To Time, Place, And Person] : oriented to person, place, and time [Impaired Insight] : insight and judgment were intact

## 2024-11-22 NOTE — REVIEW OF SYSTEMS
[Feeling Poorly] : feeling poorly [Feeling Tired] : feeling tired [Cough] : cough [Heartburn] : heartburn [Negative] : Heme/Lymph [FreeTextEntry7] : food regurgitation, chest pressure with eating

## 2024-11-26 NOTE — HISTORY OF PRESENT ILLNESS
[FreeTextEntry1] : 92 year old man presenting for follow-up of hiatal hernia, hypothyroidism, CKD [de-identified] : Fatigued constantly. He had an episode of regurgitation on Saturday and then he was very tired the rest of the day. Having these episodes perhaps once a month. He states he doesn't think he's ready for hiatal hernia surgery.  He states since the regurgitation episodes do not occur constantly, he is wondering if the recovery would be worth it.  We discussed that his chronic nocturnal cough that he often calls about is most likely at least in part due to the hiatal hernia.  Also discussed that the risk of aspiration which could lead to another aspiration pneumonia and hospitalization/intubation would be reduced by the procedure.  He will continue to think about it and discuss with his wife.  He has an upcoming follow-up with Dr. Olsen.  He had specific questions about the feeding tube that I advised he bring up at this upcoming visit.  He wants to go through his medications because he feels like he's taking too many.  We reviewed the mall and I advised that they are all for a specific purpose.  We can check blood work to see if some of his supplements are still necessary.  Reviewed that the famotidine in the pantoprazole are to reduce acid reflux given the hiatal hernia as he was not sure what they were both for.

## 2024-11-26 NOTE — HEALTH RISK ASSESSMENT
[0] : 2) Feeling down, depressed, or hopeless: Not at all (0) [PHQ-2 Negative - No further assessment needed] : PHQ-2 Negative - No further assessment needed [[id number]] : 0 [Former] : Former [20 or more] : 20 or more [> 15 Years] : > 15 Years

## 2024-11-26 NOTE — HISTORY OF PRESENT ILLNESS
[FreeTextEntry1] : 92 year old man presenting for follow-up of hiatal hernia, hypothyroidism, CKD [de-identified] : Fatigued constantly. He had an episode of regurgitation on Saturday and then he was very tired the rest of the day. Having these episodes perhaps once a month. He states he doesn't think he's ready for hiatal hernia surgery.  He states since the regurgitation episodes do not occur constantly, he is wondering if the recovery would be worth it.  We discussed that his chronic nocturnal cough that he often calls about is most likely at least in part due to the hiatal hernia.  Also discussed that the risk of aspiration which could lead to another aspiration pneumonia and hospitalization/intubation would be reduced by the procedure.  He will continue to think about it and discuss with his wife.  He has an upcoming follow-up with Dr. Olsen.  He had specific questions about the feeding tube that I advised he bring up at this upcoming visit.  He wants to go through his medications because he feels like he's taking too many.  We reviewed the mall and I advised that they are all for a specific purpose.  We can check blood work to see if some of his supplements are still necessary.  Reviewed that the famotidine in the pantoprazole are to reduce acid reflux given the hiatal hernia as he was not sure what they were both for.

## 2024-11-26 NOTE — HEALTH RISK ASSESSMENT
[0] : 2) Feeling down, depressed, or hopeless: Not at all (0) [PHQ-2 Negative - No further assessment needed] : PHQ-2 Negative - No further assessment needed [VCK4Oxyjy] : 0 [Former] : Former [20 or more] : 20 or more [> 15 Years] : > 15 Years

## 2024-11-26 NOTE — PHYSICAL EXAM
[No Acute Distress] : no acute distress [Normal Rate] : normal rate  [Regular Rhythm] : with a regular rhythm [Normal S1, S2] : normal S1 and S2 [No Murmur] : no murmur heard [No Edema] : there was no peripheral edema [No Extremity Clubbing/Cyanosis] : no extremity clubbing/cyanosis [Kyphosis] : kyphosis [Normal Affect] : the affect was normal [Alert and Oriented x3] : oriented to person, place, and time [Normal Insight/Judgement] : insight and judgment were intact [de-identified] : Coarse breath sounds at bases, improved somewhat with deep cough

## 2024-11-26 NOTE — PHYSICAL EXAM
[No Acute Distress] : no acute distress [Normal Rate] : normal rate  [Regular Rhythm] : with a regular rhythm [Normal S1, S2] : normal S1 and S2 [No Murmur] : no murmur heard [No Edema] : there was no peripheral edema [No Extremity Clubbing/Cyanosis] : no extremity clubbing/cyanosis [Kyphosis] : kyphosis [Normal Affect] : the affect was normal [Alert and Oriented x3] : oriented to person, place, and time [Normal Insight/Judgement] : insight and judgment were intact [de-identified] : Coarse breath sounds at bases, improved somewhat with deep cough electronic

## 2024-12-10 NOTE — HISTORY OF PRESENT ILLNESS
[Former] : former [TextBox_4] : The patient is a 91-year-old M, former smoker, with PMHx of HTN, CKD, Emphysema/COPD, HTN, GERD with hiatal hernia, CYNTHIA on CPAP, referred by Dr. Tavares to establish care as his previous pulmonologist Dr. Fink at [facility] no longer accepts his insurance. Has emphysema and chronic LA, taking Trelegy and Combivent prn. GERD/hiatal hernia also contribute to dyspnea. Able to walk about 2 blocks. States he was hospitalized with PNA 1 year ago, no exacerbations since. Has CYNTHIA on CPAP, machine is >8 years ago. Retired commercial real estate worker. Interested in [employer]. Leaving May 23rd for trip to [place] for 40th wedding anniversary. Brings with him records from prior pulmonologist.  5/17/2024 Had HST and here to discuss results.  7/17/2024 Developed URI while abroad in early June, treated with 2 rounds of Azithromycin, course of steroids, and course of Augmentin. Started Azelastine/Flonase in late June. Compliant with Trelegy. Still has significant cough, worse at night, as well as PND. Has coughing fits at night that induce vomiting. Had CXR 7/1 that showed opacities. Some dyspnea, much better but not back to baseline. Using nebulizer treatments prn. Started new CPAP, feels pressure is too low.  8/13/2024 Here post ICU stay with intubation. Hospitalized for emesis, aspiration, hypoxemia. Is doing okay. Finishing abx and pred course.  8/26/2024 Here for follow up. Slowly improving from hospitalization. Taking nasal sprays and singulair. PND still bothering him. Completed abx and prednisone. Rarely using his rescue inhaler.  10/2/2024 Still with sob but slowly improving. Is considering hernia repair. PAP is going well but has a lot of GI issues at night. Got Breztri samples and would like to try it.    12/10/2024 LA same as last time, worse with stairs. Felt breathing acutely worse last week with cold air but this has improved as weather has warmed. Continues on Trelegy. PAP going ok; often rips it off at night. Uses nasal mask. Considering hernia repair, hesitant about recovery. Has appointment late Dec with dietician.  Cough has improved significantly, no longer awakening him at night. [ESS] : 2

## 2024-12-10 NOTE — ASSESSMENT
[FreeTextEntry1] : REVIEWED 1. Records from Dr. Fink, previous pulmonologist reviewed from 2014 2. CT chest 6/2020: peribronchovascular consolidation and centrilobular GG nodules R long mostly decreased, increased bronchiolitis which now involves anterior RUL. Bronchial wall thickening. Stable 5mm GG nodule CORBIN. Upper lobe predominant emphysema. Large hiatal hernia with patulous upper esophagus (radiology report) 3.CT Chest 5/2023: chronic bronchitis and bronchiolitis with subtle new bronchiolitis RUL likely secondary to recurrent aspiration given moderate to large hiatal hernia, new 4mm GGO (radiology report) 4. PFT 11/2023: FEV1 69, FEV1/FVC 74, TLC 72, DLCO 102 5. PFT 3/2023: FEV1 91, FEV1/FVC 71, TLC 82, DLCO 93, 6. PFT 3/2022: FEV1 85, FEV1/FVC 88, TLC 80, DLCO 79 7. HST 8/30/2021: AHI 37 8. HST 10/2015: AHI 32 9. HST 5/2024: AHI 16.3 4%; AHI 26.5 3%; T88 2% 10. CXR 7/1/102024: R opacities 11. Airview data 6/2024-7/2024: Average use of 3 hours 30 minutes, 20% adherence of >4 hours per night of use, therapeutic AHI of 1.9,95th percentile pressure 10.5 cm H2O 12. [facility] records: had acute aspiration from emesis, low sats, needed vent; BAL with candida and normal respiratory sonido; intubated x 3 days 13. CT chest from 9/2024 reviewed: RUL, RML RLL GGOs; large hiatal henria; CORBIN GGOs improved; severe coronary calcifications. Emphysema is known. 14. TTE 5/2023: normal LVEF, mild TR, mild pulmonary HTN 15. PFT 10/8/2024: FEV1 72, FEV1/FVC 64, TLC 82, DLCO 35 16. 6MWT 10/8/2024: 95% on RA, 93% after walking 457 meters 17. AirView data 9/2024-12/2024: Average use of 4 hours, 12% adherence of >4 hours per night of use, therapeutic AHI of 1.7,95th percentile pressure 11.2  The patient is a 92-year-old M, former smoker, with PMHx of HTN, CKD, Emphysema/COPD, HTN, GERD with hiatal hernia, CYNTHIA on CPAP, following up today. Had an aspiration pneumonia, acute hypoxemic respiratory failure requiring vent 8/2024. Feeling better now but still has LA with stairs. Will increase Trelegy dose to 200/62.5/25 mcg/act from 100. Can do a singulair holiday. CT chest from 9/2024 improving, PFTS with moderate obstruction, mild restriction, severe decrease in diffusion capacity. Diffusion capacity lower likely in the setting of recent pneumonia however will obtain TTE given mild pulm HTN noted on echo in 2023. Plan to repeat PFT 2/2024. Cough has improved significantly with new regimen started by ENT (Ipratropium nasal spray, pantoprazole 40 mg BID+ famotidine 40 mg nightly). Considering HH surgery buy may defer, is planning to see dietician to work on reflux diet.  Sleep: Has CYNTHIA on CPAP, previous machine >8 years ago. HST re-demonstrated severe CYNTHIA. New PAP ordered through [facility]; AHI is therapeutic. Possible nocturnal GI issues are aerophagia?? Will look into MAD; has dentist.  Follow-up after TTE and 2/2024 for PFTs.

## 2024-12-10 NOTE — HISTORY OF PRESENT ILLNESS
[Former] : former [TextBox_4] : The patient is a 91-year-old M, former smoker, with PMHx of HTN, CKD, Emphysema/COPD, HTN, GERD with hiatal hernia, CYNTHIA on CPAP, referred by Dr. Tavares to establish care as his previous pulmonologist Dr. Fink at [facility] no longer accepts his insurance. Has emphysema and chronic LA, taking Trelegy and Combivent prn. GERD/hiatal hernia also contribute to dyspnea. Able to walk about 2 blocks. States he was hospitalized with PNA 1 year ago, no exacerbations since. Has CYNTHIA on CPAP, machine is >8 years ago. 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 Brings with him records from prior pulmonologist.  5/17/2024 Had HST and here to discuss results.  7/17/2024 Developed URI while abroad in early June, treated with 2 rounds of Azithromycin, course of steroids, and course of Augmentin. Started Azelastine/Flonase in late June. Compliant with Trelegy. Still has significant cough, worse at night, as well as PND. Has coughing fits at night that induce vomiting. Had CXR 7/1 that showed opacities. Some dyspnea, much better but not back to baseline. Using nebulizer treatments prn. Started new CPAP, feels pressure is too low.  8/13/2024 Here post ICU stay with intubation. Hospitalized for emesis, aspiration, hypoxemia. Is doing okay. Finishing abx and pred course.  8/26/2024 Here for follow up. Slowly improving from hospitalization. Taking nasal sprays and singulair. PND still bothering him. Completed abx and prednisone. Rarely using his rescue inhaler.  10/2/2024 Still with sob but slowly improving. Is considering hernia repair. PAP is going well but has a lot of GI issues at night. Got Breztri samples and would like to try it.    12/10/2024 LA same as last time, worse with stairs. Felt breathing acutely worse last week with cold air but this has improved as weather has warmed. Continues on Trelegy. PAP going ok; often rips it off at night. Uses nasal mask. Considering hernia repair, hesitant about recovery. Has appointment late Dec with dietician.  Cough has improved significantly, no longer awakening him at night. [ESS] : 2

## 2024-12-10 NOTE — PHYSICAL EXAM
[No Clubbing] : no clubbing [Normal Color/ Pigmentation] : normal color/ pigmentation [No Acute Distress] : no acute distress [No Resp Distress] : no resp distress [Clear to Auscultation Bilaterally] : clear to auscultation bilaterally [Oriented x3] : oriented x3 [Normal Affect] : normal affect [Normal Appearance] : normal appearance [Normal Gait] : normal gait

## 2024-12-10 NOTE — CONSULT LETTER
[Dear  ___] : Dear  [unfilled], [Courtesy Letter:] : I had the pleasure of seeing your patient, [unfilled], in my office today. [Please see my note below.] : Please see my note below. [Consult Closing:] : Thank you very much for allowing me to participate in the care of this patient.  If you have any questions, please do not hesitate to contact me. [Sincerely,] : Sincerely, [FreeTextEntry3] : Debbie Ramirez MD  [employer] at [facility] Pulmonary, Critical Care, and Sleep Medicine

## 2024-12-10 NOTE — END OF VISIT
[FreeTextEntry3] :   I, Debbie Ramirez MD, personally performed the evaluation and management (E/M) services for this patient. That E/M includes conducting the clinically appropriate initial history &/or exam, assessing all conditions, and establishing the plan of care. I have also independently reviewed the medical records and imaging at the time of this office visit, and discussed the above mentioned images with interpretations with the patient. Today, PRESTON Tejeda was here to participate in the visit & follow plan of care established by me.

## 2024-12-10 NOTE — ASSESSMENT
[FreeTextEntry1] : REVIEWED 1. Records from Dr. Fink, previous pulmonologist reviewed from 2014 2. CT chest 6/2020: peribronchovascular consolidation and centrilobular GG nodules R long mostly decreased, increased bronchiolitis which now involves anterior RUL. Bronchial wall thickening. Stable 5mm GG nodule CORBIN. Upper lobe predominant emphysema. Large hiatal hernia with patulous upper esophagus (radiology report) 3.CT Chest 5/2023: chronic bronchitis and bronchiolitis with subtle new bronchiolitis RUL likely secondary to recurrent aspiration given moderate to large hiatal hernia, new 4mm GGO (radiology report) 4. PFT 11/2023: FEV1 69, FEV1/FVC 74, TLC 72, DLCO 102 5. PFT 3/2023: FEV1 91, FEV1/FVC 71, TLC 82, DLCO 93, 6. PFT 3/2022: FEV1 85, FEV1/FVC 88, TLC 80, DLCO 79 7. HST 8/30/2021: AHI 37 8. HST 10/2015: AHI 32 9. HST 5/2024: AHI 16.3 4%; AHI 26.5 3%; T88 2% 10. CXR 7/1/102024: R opacities 11. Airview data 6/2024-7/2024: Average use of 3 hours 30 minutes, 20% adherence of >4 hours per night of use, therapeutic AHI of 1.9,95th percentile pressure 10.5 cm H2O 12. [facility] records: had acute aspiration from emesis, low sats, needed vent; BAL with candida and normal respiratory sonido; intubated x 3 days 13. CT chest from 9/2024 reviewed: RUL, RML RLL GGOs; large hiatal henria; CORBIN GGOs improved; severe coronary calcifications. Emphysema is known. 14. TTE 5/2023: normal LVEF, mild TR, mild pulmonary HTN 15. PFT 10/8/2024: FEV1 72, FEV1/FVC 64, TLC 82, DLCO 35 16. 6MWT 10/8/2024: 95% on RA, 93% after walking 457 meters 17. AirView data 9/2024-12/2024: Average use of 4 hours, 12% adherence of >4 hours per night of use, therapeutic AHI of 1.7,95th percentile pressure 11.2  The patient is a 92-year-old M, former smoker, with PMHx of HTN, CKD, Emphysema/COPD, HTN, GERD with hiatal hernia, CYNTHIA on CPAP, following up today. Had an aspiration pneumonia, acute hypoxemic respiratory failure requiring vent 8/2024. Feeling better now but still has LA with stairs. Will increase Trelegy dose to 200/62.5/25 mcg/act from 100. Can do a singulair holiday. CT chest from 9/2024 improving, PFTS with moderate obstruction, mild restriction, severe decrease in diffusion capacity. Diffusion capacity lower likely in the setting of recent pneumonia however will obtain TTE given mild pulm HTN noted on echo in 2023. Plan to repeat PFT 2/2024. Cough has improved significantly with new regimen started by ENT (Ipratropium nasal spray, pantoprazole 40 mg BID+ famotidine 40 mg nightly). Considering HH surgery buy may defer, is planning to see dietician to work on reflux diet.  Sleep: Has CYNTHIA on CPAP, previous machine >8 years ago. HST re-demonstrated severe CYNTHIA. New PAP ordered through [facility]; AHI is therapeutic. Possible nocturnal GI issues are aerophagia?? Will look into MAD; has dentist.  Follow-up after TTE and 2/2024 for PFTs.

## 2024-12-10 NOTE — CONSULT LETTER
[Dear  ___] : Dear  [unfilled], [Courtesy Letter:] : I had the pleasure of seeing your patient, [unfilled], in my office today. [Please see my note below.] : Please see my note below. [Consult Closing:] : Thank you very much for allowing me to participate in the care of this patient.  If you have any questions, please do not hesitate to contact me. [Sincerely,] : Sincerely, [FreeTextEntry3] : Debbie Ramirez MD  [employer] at [facility] Pulmonary, Critical Care, and Sleep Medicine

## 2024-12-11 NOTE — REVIEW OF SYSTEMS
[Recent Weight Loss (___ Lbs)] : recent [unfilled] ~Ulb weight loss [Diarrhea] : diarrhea [Abdominal Pain] : no abdominal pain [Vomiting] : no vomiting [Constipation] : no constipation [Heartburn] : no heartburn [Melena (black stool)] : no melena [Bleeding] : no bleeding [Fecal Incontinence (soiling)] : no fecal incontinence [Bloating (gassiness)] : no bloating [FreeTextEntry2] : states he lost 25 lbs over the past year [FreeTextEntry7] : reflux/dysphagia

## 2024-12-11 NOTE — PHYSICAL EXAM
[Alert] : alert [Normal Voice/Communication] : normal voice/communication [Healthy Appearing] : healthy appearing [No Acute Distress] : no acute distress [No Respiratory Distress] : no respiratory distress [Abdomen Tenderness] : non-tender [No Masses] : no abdominal mass palpated [Abdomen Soft] : soft [Oriented To Time, Place, And Person] : oriented to person, place, and time

## 2024-12-11 NOTE — ASSESSMENT
[FreeTextEntry1] : #GERD/#Fatigue - Prescribe omeprazole 2x/daily, d/c pantoprazole - Educated pt to continue to take famotidine as prescribed by Dr. Bahena - Recommended continue liquid Gaviscon - Encouraged pt to have his last meal of the day earlier in evening, smaller meals throughout day - Thoracic appt with Dr. Olsen tomorrow 12/12/24: Pt unsure if he wants surgery at this point - Instructed pt to f/u with nutritionist: has appt 12/24/24 - Advise patient to avoid spicy foods, greasy foods, tomato-based foods, citrus, caffeine, chocolate. Recommend sitting upright for at least 2-3 hours after eating - Educated pt to continue to use wedge pillow for sleeping - Advised pt that fatigue may be r/t nutrition d/t having to eat smaller portions of meals and may be r/t GERD s/s - Educated pt to continue following up with PCP for labwork/monitor other medical conditions  #Hiatal Hernia - Educated pt that surgery would decrease s/s of reflux/dysphagia - Thoracic appt with Dr. Olsen tomorrow 12/12/24: Pt states he does not think he can handle the surgery at this point  F/u in 3 mos  INikki NP, am scribing for and in the presence of Dr. Praneeth Sylvester the following sections: history of present illness, past medical/family/social history; review of systems; vital signs; physical exam; disposition.  Praneeth DOUGLASS MD, personally performed the services described in the documentation, reviewed the documentation recorded by the scribe in my presence and it accurately and completely records my words and actions.

## 2024-12-11 NOTE — HISTORY OF PRESENT ILLNESS
[FreeTextEntry1] : 93 y/o M with PMH of CKD, HTN, HLD, ASHD, COPD, hiatal hernia s/p repair and recurrence, and hypothyroidism who presents for follow up.  C/o of dysphagia and reflux, states it has improved since his last visit. Denies heartburn. Pt states he bought a higher wedge pillow to use at night, denies waking up at night with reflux s/s.  States he is using CPAP machine.  States he has been following instructions to have smaller meals throughout the day since last visit. States he has been taking Gaviscon before bedtime. States since he has been taking it he denies having episodes of n/v. States he has not had vomiting episode for past 1-1.5 months.  States he has episodes of fatigue and will occasionally sleep 14-15 hours at a time. States he lost 25 lbs in the past year. Denies melena and hematochezia, states he has occasional diarrhea, once every few weeks which resolves after one instance.  States he followed up with Dr. Bahena ENT, states he was placed on a new nasal spray that he uses when his nose is running. States it has improved his s/s of postnasal drip.  States he d/cd omeprazole as per Dr. Bahena's instructions and now takes famotidine 2x/daily at bedtime and pantoprazole 2x/daily.  Hospitalized in early August for six days with pneumonia in the [facility], intubated for three days. Pt had a bronchoscopy with removal of particulate matter. Pt was vomiting when EMTs took him to the hospital.  Has scheduled appt with Dr. Olsen tomorrow 12/12/24, will discuss pros and cons of surgery for hernia repair. States at this time he is unsure if he wants to proceed with surgery, may want to see nutritionist first.  Has scheduled appt with nutritionist 12/24/24.  Imaging:  Esophagram on 02/07/2024 that showed large hiatal hernia and tortuous and dilated distal esophagus up to 3.9 cm 9/24/24 CT Chest: Persistent multifocal bilateral infectious/inflammatory pneumonitis, greater on the right than on the left. 3/16/23 MR Abdomen and Pelvis: Cholelithiasis, Large hiatal hernia.  EGD: unsure.   Colonoscopy: At age 80 with Dr. Diego Hodges.   PRIOR:      92 yo male with hx of CKD, HTN, HLD, ASHD, COPD, hiatal hernia s/p repair and recurrence, and hypothyroidism who presents for follow up. Hospitalized in early August for six days with pneumonia in the [facility], intubated for three days. Pt had a bronchoscopy with removal of particulate matter. Pt was vomiting when EMTs took him to the hospital. Pt has post-nasal drip, feels that that contributes to him getting sick and vomiting. Pt has never seen Dr. Olsen of thoracic surgery. Just overnight vomited and then again this morning. Pt has an appt with an ENT, Dr. Welch. Notes that he has Gaviscon at home but is not taking it. Pt has a CPAP machine but unable to use secondary to breathing and vomiting. Eats dinner at 8 PM, bedtime at 12-1. Vomits one or two days a week with wretching. Takes omeprazole 40 mg bid. Pt has a wedge pillow, so he is high. Patient underwent hiatal hernia repair in 1995 with Dr. Herson Braun. Patient states that he felt well until a few years ago when his symptoms recurred. Saw Annetta Nelson two years ago at [facility] but decided against surgery. Esophagram on 02/07/2024 that showed large hiatal hernia and tortuous and dilated distal esophagus up to 3.9 cm.  No famhx of colon, gastric and pancreatic cancer.   Gastroenterology Summary   Upper Endoscopy: unsure.   Colonoscopy: At age 80 with Dr. Diego Hodges.

## 2024-12-11 NOTE — HISTORY OF PRESENT ILLNESS
[FreeTextEntry1] : 93 y/o M with PMH of CKD, HTN, HLD, ASHD, COPD, hiatal hernia s/p repair and recurrence, and hypothyroidism who presents for follow up.  C/o of dysphagia and reflux, states it has improved since his last visit. Denies heartburn. Pt states he bought a higher wedge pillow to use at night, denies waking up at night with reflux s/s.  States he is using CPAP machine.  States he has been following instructions to have smaller meals throughout the day since last visit. States he has been taking Gaviscon before bedtime. States since he has been taking it he denies having episodes of n/v. States he has not had vomiting episode for past 1-1.5 months.  States he has episodes of fatigue and will occasionally sleep 14-15 hours at a time. States he lost 25 lbs in the past year. Denies melena and hematochezia, states he has occasional diarrhea, once every few weeks which resolves after one instance.  States he followed up with Dr. Bahena ENT, states he was placed on a new nasal spray that he uses when his nose is running. States it has improved his s/s of postnasal drip.  States he d/cd omeprazole as per Dr. Bahena's instructions and now takes famotidine 2x/daily at bedtime and pantoprazole 2x/daily.  Hospitalized in early August for six days with pneumonia in the [facility], intubated for three days. Pt had a bronchoscopy with removal of particulate matter. Pt was vomiting when EMTs took him to the hospital.  Has scheduled appt with Dr. Olsen tomorrow 12/12/24, will discuss pros and cons of surgery for hernia repair. States at this time he is unsure if he wants to proceed with surgery, may want to see nutritionist first.  Has scheduled appt with nutritionist 12/24/24.  Imaging:  Esophagram on 02/07/2024 that showed large hiatal hernia and tortuous and dilated distal esophagus up to 3.9 cm 9/24/24 CT Chest: Persistent multifocal bilateral infectious/inflammatory pneumonitis, greater on the right than on the left. 3/16/23 MR Abdomen and Pelvis: Cholelithiasis, Large hiatal hernia.  EGD: unsure.   Colonoscopy: At age 80 with Dr. Diego Hodges.   PRIOR:      92 yo male with hx of CKD, HTN, HLD, ASHD, COPD, hiatal hernia s/p repair and recurrence, and hypothyroidism who presents for follow up. Hospitalized in early August for six days with pneumonia in the [facility], intubated for three days. Pt had a bronchoscopy with removal of particulate matter. Pt was vomiting when EMTs took him to the hospital. Pt has post-nasal drip, feels that that contributes to him getting sick and vomiting. Pt has never seen Dr. Olsen of thoracic surgery. Just overnight vomited and then again this morning. Pt has an appt with an ENT, Dr. Welch. Notes that he has Gaviscon at home but is not taking it. Pt has a CPAP machine but unable to use secondary to breathing and vomiting. Eats dinner at 8 PM, bedtime at 12-1. Vomits one or two days a week with wretching. Takes omeprazole 40 mg bid. Pt has a wedge pillow, so he is high. Patient underwent hiatal hernia repair in 1995 with Dr. Herson Braun. Patient states that he felt well until a few years ago when his symptoms recurred. Saw Annetta Nelson two years ago at [facility] but decided against surgery. Esophagram on 02/07/2024 that showed large hiatal hernia and tortuous and dilated distal esophagus up to 3.9 cm.  No famhx of colon, gastric and pancreatic cancer.   Gastroenterology Summary   Upper Endoscopy: unsure.   Colonoscopy: At age 80 with Dr. Diego Hodges.

## 2024-12-16 NOTE — ASSESSMENT
[FreeTextEntry1] : Patient is a 92 year old male, known to me, who was initially referred for a symptomatic recurrent hiatal hernia. I have seen him twice, with initial consultation on October 17. Discussed potential surgical options and plans. He returned today to discuss that he would like to forego surgery at this time and manage any symptoms medically and with behavioral changes. I discussed that this is a very reasonable approach secondary to his age, comorbidities and the redo nature of the operation. I reinforced eating hygiene and good antireflux and regurgitation behavior with him. He will contact us at any time if he has any issues or concerns. He understood and agreed.   PLAN 1.  Follow up in Thoracic Surgery clinic as needed.    I, Dr. Olsen, personally performed the evaluation and management (E/M) services for this established patient who presents today with (a) new problem(s)/exacerbation of (an) existing condition(s). That E/M includes conducting the clinically appropriate interval history &/or exam, assessing all new/exacerbated conditions, and establishing a new plan of care. Today, my RAMBO, [Idania Small], was here to observe my evaluation and management service for this new problem/exacerbated condition and follow the plan of care established by me going forward.

## 2024-12-16 NOTE — HISTORY OF PRESENT ILLNESS
[FreeTextEntry1] : Patient is a 92-year-old male, a former smoker with COPD, CKD (creatinine 1.78, eGFR 36), hypertension, and CAD, is referred by James Amaro, and Nga for a symptomatic, recurrent hiatal hernia. He underwent hiatal hernia repair via left thoracotomy 30 years ago, with recurrence approximately 20 years ago. He reports frequent vomiting and aspiration, recently requiring intubation and ICU admission for aspiration pneumonia. A general surgeon previously offered laparoscopic robotic-assisted repair.  Imaging reveals approximately two-thirds of his stomach herniated into his chest, a dilated and fluid-filled proximal esophagus, and interstitial lung changes consistent with aspiration. Although he tolerates a diet, he vomits frequently and experienced a significant aspiration event. Surgical repair is indicated. At a prior visit, hernia repair with concurrent gastrostomy tube placement was discussed. The gastrostomy tube would facilitate postoperative feeding and provide an additional point of fixation to the anterior abdominal wall. Given the long-standing hernia, esophageal dilation, and likely esophageal dysmotility, postoperative aspiration risk would be minimized by temporarily withholding oral intake.  He has been evaluated by cardiology and his primary medical doctor for preoperative risk assessment. Spirometry was also completed. No further hernia-specific testing is required. At his prior visit, he wished to further consider surgery before proceeding. He was advised to continue eating a soft diet and have frequent small meals. He was referred to nutrition as well.   He presents today for a follow up visit to discuss possible surgery.   Patient reports that he doesn't feel that he is fit enough to undergo surgery. He states that he hasn't had an episode of vomiting in over a month ( last episode was triggered by eating too large of a meal). He reports that his reflux has been better managed on his new medication (gaviscon). He has no other complaints at this time. He has tried some lifestyle interventions and is scheduled to meet with nutritionist on 12/24.

## 2024-12-16 NOTE — PHYSICAL EXAM
[] : no respiratory distress [Respiration, Rhythm And Depth] : normal respiratory rhythm and effort [Apical Impulse] : the apical impulse was normal [Heart Rate And Rhythm] : heart rate was normal and rhythm regular [Examination Of The Chest] : the chest was normal in appearance [Bowel Sounds] : normal bowel sounds [Abdomen Soft] : soft [Abnormal Walk] : normal gait [Oriented To Time, Place, And Person] : oriented to person, place, and time [Impaired Insight] : insight and judgment were intact

## 2024-12-16 NOTE — REVIEW OF SYSTEMS
[Cough] : no cough [SOB on Exertion] : shortness of breath during exertion [Abdominal Pain] : no abdominal pain [Vomiting] : no vomiting [Negative] : Heme/Lymph [FreeTextEntry7] : GERD controlled

## 2025-01-08 NOTE — HISTORY OF PRESENT ILLNESS
[FreeTextEntry1] : 92-year-old man with a long history of hypertension, albuminuria for over 70 years since age 20, stage IV CKD due to a combination of obstructive uropathy, nephrolithiasis, underlying glomerular disease of unknown etiology, as well as urologic issues.  He has had lithotripsy in the past and has LUTS.  He has had [facility] ED twice and TURP.  He does have secondary hyperparathyroidism that is treated with calcitriol.  His renal function has been stable with a creatinine of 1.86 in November, BUN 37, K4.3 then, and in mid December, creatinine 1.9, BUN 42, GFR 3 3 by creatinine but only 27 by Cystatin C, , calcium 9.1, K4.4, CO2 23.  I reviewed 4 visits that he has had since I last saw him in late September.  On November 15, he saw Dr. Gallagher for urologic follow-up with a BP of 138/86.  He is known to have incomplete bladder emptying but no discrete hydronephrosis.  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  On November 18, he had a visit with Dr. Criselda Beasley, with a BP of 123/62.  On December 12, he saw Dr. Ferdinand Olsen regarding possible surgical treatment of recurrent hiatal hernia.  He very reasonably concluded that this was not good option at age 92.  On December 24, he had a nutrition visit regarding GERD options.  He came to our office today and registered with our staff at the , and was ushered into examining room 1, where he seemed alert conversant and pretty much as usual.  I entered the room probably 5 to 8 minutes after he was ushered there , and found him slumped in his chair drooling from the mouth with no pulse.  We transferred him to the floor, Dr. Vu, myself, Dr. Nagy and called a code.  Shock was delivered while chest compression was done.  There was no pulse.  Code team arrived and resuscitation continued.

## 2025-01-08 NOTE — ASSESSMENT
[FreeTextEntry1] : 92-year-old man with a history of stage IV CKD, hypertension, albuminuria for 72 years since age 20, kidney stones, BPH/LUTS.  He has had 4 office visits within the last 7 weeks, with blood pressures of 138/86 on November 15, 123/62 in November 18, 137/63 on December 12.  That visit was a surgical visit to discuss surgery for hiatal hernia which she declined, saying that he felt he was too old.  He came to our office today for a routine visit, with kidney function that has been stable.  He voices no complaints to the reception staff and was placed in an examining room.  I am guessing that I probably entered the room 5 to 10 minutes after he had entered it and found him slumped in the chair next to the examining table, drooling, and incontinent.  Code was called and multiple team members helped in the resuscitation efforts including electric shock, chest compression.  I did not feel a pulse or hear a heartbeat when I first found him there.  I have called his wife, Abril Armas and gently explained what had happened.  She has no immediate family or friend to take her here but stated that her doorman will get her.  She will come to the ER which is where he is being taken.  [place] appears extremely guarded.